# Patient Record
Sex: MALE | Race: WHITE
[De-identification: names, ages, dates, MRNs, and addresses within clinical notes are randomized per-mention and may not be internally consistent; named-entity substitution may affect disease eponyms.]

---

## 2018-05-05 ENCOUNTER — HOSPITAL ENCOUNTER (EMERGENCY)
Dept: HOSPITAL 97 - ER | Age: 33
Discharge: HOME | End: 2018-05-05
Payer: COMMERCIAL

## 2018-05-05 DIAGNOSIS — Z88.0: ICD-10-CM

## 2018-05-05 DIAGNOSIS — Z88.1: ICD-10-CM

## 2018-05-05 DIAGNOSIS — S62.315A: Primary | ICD-10-CM
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
## 2018-05-05 PROCEDURE — 2W3KX1Z IMMOBILIZATION OF LEFT FINGER USING SPLINT: ICD-10-PCS

## 2018-05-05 PROCEDURE — 99284 EMERGENCY DEPT VISIT MOD MDM: CPT

## 2018-05-05 NOTE — RAD REPORT
EXAM DESCRIPTION:  RAD -Hand Left 3 View - 5/5/2018 2:09 pm

 

CLINICAL HISTORY:

Left hand pain status post injury

 

FINDINGS:  A comminuted fracture involves the fourth metacarpal neck with mild displacement of the fr
acture fragments and angulation at the fracture site.

 

No dislocation is seen

## 2018-05-05 NOTE — ER
Nurse's Notes                                                                                     

 [facility]                                                                

Name: Rajesh Kenny                                                                       

Age: 32 yrs                                                                                       

Sex: Male                                                                                         

: 1985                                                                                   

MRN: [id number]                                                                                   

Arrival Date: 2018                                                                          

Time: 13:10                                                                                       

Account#: [id number]                                                                            

Bed 28                                                                                            

Private MD:                                                                                       

Diagnosis: Displaced fracture of base of fourth metacarpal bone, left hand                        

                                                                                                  

Presentation:                                                                                     

                                                                                             

13:16 Presenting complaint: Patient states: "I punched my truck today around 09:30 am". Pt    aa5 

      c/o left hand pain. Transition of care: patient was not received from another setting       

      of care. Onset of symptoms was May 05, 2018. Initial Sepsis Screen: Does the patient        

      meet any 2 criteria? No. Patient's initial sepsis screen is negative. Does the patient      

      have a suspected source of infection? No. Patient's initial sepsis screen is negative.      

      Care prior to arrival: None.                                                                

13:16 Method Of Arrival: Ambulatory                                                           aa5 

13:16 Acuity: SARAVANAN 4                                                                           aa5 

                                                                                                  

Historical:                                                                                       

- Allergies:                                                                                      

13:18 Amoxil;                                                                                 aa5 

13:18 PENICILLINS;                                                                            aa5 

- PMHx:                                                                                           

13:18 None;                                                                                   aa5 

- PSHx:                                                                                           

13:18 None;                                                                                   aa5 

                                                                                                  

- Immunization history:: Last tetanus immunization: up to date.                                   

- Social history:: Smoking status: Patient uses tobacco products, smokes one pack                 

  cigarettes per day.                                                                             

                                                                                                  

                                                                                                  

Screenin:45 Abuse screen: Denies threats or abuse. Nutritional screening: No deficits noted.        tl3 

      Tuberculosis screening: No symptoms or risk factors identified. Fall Risk None              

      identified.                                                                                 

                                                                                                  

Assessment:                                                                                       

13:30 General: Appears in no apparent distress. comfortable, well groomed, well developed,    tl3 

      well nourished, Behavior is calm, cooperative, appropriate for age. Pain: Complains of      

      pain in left hand and dorsum of left hand. Neuro: Level of Consciousness is awake,          

      alert, obeys commands, Oriented to person, place, time, situation, Appropriate for age.     

      Cardiovascular: No deficits noted. Cardiovascular: Capillary refill < 3 seconds in          

      right in left. Respiratory: No deficits noted. Airway is patent Trachea midline             

      Respiratory effort is even, unlabored, Respiratory pattern is regular, symmetrical. GI:     

      No signs and/or symptoms were reported involving the gastrointestinal system. : No        

      signs and/or symptoms were reported regarding the genitourinary system. EENT: No signs      

      and/or symptoms were reported regarding the EENT system. Derm: No signs and/or symptoms     

      reported regarding the dermatologic system. Musculoskeletal: Swelling present in left       

      hand and dorsum of left hand. Injury Description: pt punched truck.                         

14:45 Reassessment: Patient appears in no apparent distress at this time. No changes from     tl3 

      previously documented assessment. Patient and/or family updated on plan of care and         

      expected duration. Pain level reassessed. Patient is alert, oriented x 3, equal             

      unlabored respirations, skin warm/dry/pink.                                                 

                                                                                                  

Vital Signs:                                                                                      

13:17  / 99; Pulse 89; Resp 16 S; Temp 98.0(TE); Pulse Ox 98% on R/A; Weight 113.4 kg   aa5 

      (R); Height 6 ft. 1 in. (185.42 cm) (R); Pain 3/10;                                         

15:29  / 82; Pulse 78; Resp 18; Pulse Ox 100% ;                                         tl3 

13:17 Body Mass Index 32.98 (113.40 kg, 185.42 cm)                                            aa5 

                                                                                                  

ED Course:                                                                                        

13:10 Patient arrived in ED.                                                                  sb2 

13:14 Hilary Ramirez FNP-C is PHCP.                                                        kb  

13:14 Caleb Lewis MD is Attending Physician.                                                kb  

13:17 Triage completed.                                                                       aa5 

13:18 Arm band placed on.                                                                     aa5 

14:03 X-ray completed. Portable x-ray completed in exam room. Patient tolerated procedure     la2 

      well.                                                                                       

14:05 Hand Left 3 View XRAY In Process Unspecified.                                           EDMS

14:41 Kaylan Adames, RN is Primary Nurse.                                                     tl3 

14:45 Patient has correct armband on for positive identification. Bed in low position. Call   tl3 

      light in reach. Adult w/ patient.                                                           

14:45 No provider procedures requiring assistance completed. Patient did not have IV access   tl3 

      during this emergency room visit.                                                           

15:28 Orthoglass splint: Ulnar gutter/Boxer splint applied on right forearm.                  tl3 

                                                                                                  

Administered Medications:                                                                         

15:00 Drug: Norco (7.5 mg-325 mg) 1 tabs Route: PO;                                           tl3 

15:30 Follow up: Response: Medication administered at discharge.                              tl3 

                                                                                                  

                                                                                                  

Outcome:                                                                                          

14:31 Discharge ordered by MD.                                                                kb  

15:28 Discharged to home                                                                      tl3 

15:28 Condition: good                                                                             

15:28 Discharge instructions given to patient, Instructed on discharge instructions, follow       

      up and referral plans. medication usage, Demonstrated understanding of instructions,        

      follow-up care, medications, splint care, Prescriptions given X 1.                          

15:30 Patient left the ED.                                                                    tl3 

                                                                                                  

Signatures:                                                                                       

Dispatcher [employer]                           EDHilary Reardon, LORIP-C                 MAIK-Rosa Wesley, RN                     RN   aa5                                                  

Arielle Junior                               la2                                                  

Precious Elizalde                               sb2                                                  

Kaylan Adames, RN                       RN   tl3                                                  

                                                                                                  

Corrections: (The following items were deleted from the chart)                                    

13:18 13:17 Pulse 89bpm; Resp 16bpm; Spontaneous; Pulse Ox 98% RA; Temp 98.0F Temporal; 113.4 aa5 

      kg Reported; Height 6 ft. 1 in. Reported; BMI: 32.9; Pain 3/10; aa5                         

15:22 13:30 Reassessment: Patient appears in no apparent distress at this time. No changes    tl3 

      from previously documented assessment. Patient and/or family updated on plan of care        

      and expected duration. Pain level reassessed. Patient is alert, oriented x 3, equal         

      unlabored respirations, skin warm/dry/pink. tl3                                             

                                                                                                  

**************************************************************************************************

## 2018-05-05 NOTE — EDPHYS
Physician Documentation                                                                           

 [facility]                                                                

Name: Rajesh Kenny                                                                       

Age: 32 yrs                                                                                       

Sex: Male                                                                                         

: 1985                                                                                   

MRN: [id number]                                                                                   

Arrival Date: 2018                                                                          

Time: 13:10                                                                                       

Account#: [id number]                                                                            

Bed 28                                                                                            

Private MD:                                                                                       

ED Physician Caleb Lewis                                                                         

HPI:                                                                                              

                                                                                             

13:52 This 32 yrs old  Male presents to ER via Ambulatory with complaints of Crush   kb  

      Injury To Hand.                                                                             

13:52 The patient or guardian reports injury, pain, swelling, tenderness. The complaints      kb  

      affect the dorsum of left hand. Context: The problem was sustained at home, resulted        

      from using own fist to strike, a solid object. Onset: The symptoms/episode                  

      began/occurred this morning. Modifying factors: The symptoms are alleviated by nothing,     

      the symptoms are aggravated by movement. Associated signs and symptoms: The patient has     

      no apparent associated signs or symptoms. Severity of symptoms: At their worst the          

      symptoms were moderate, in the emergency department the symptoms are unchanged. The         

      patient has not experienced similar symptoms in the past. The patient has not recently      

      seen a physician.                                                                           

                                                                                                  

Historical:                                                                                       

- Allergies:                                                                                      

13:18 Amoxil;                                                                                 aa5 

13:18 PENICILLINS;                                                                            aa5 

- PMHx:                                                                                           

13:18 None;                                                                                   aa5 

- PSHx:                                                                                           

13:18 None;                                                                                   aa5 

                                                                                                  

- Immunization history:: Last tetanus immunization: up to date.                                   

- Social history:: Smoking status: Patient uses tobacco products, smokes one pack                 

  cigarettes per day.                                                                             

                                                                                                  

                                                                                                  

ROS:                                                                                              

13:50 Constitutional: Negative for fever, chills, and weight loss, Cardiovascular: Negative   kb  

      for chest pain, palpitations, and edema, Respiratory: Negative for shortness of breath,     

      cough, wheezing, and pleuritic chest pain, Abdomen/GI: Negative for abdominal pain,         

      nausea, vomiting, diarrhea, and constipation, Neuro: Negative for headache, weakness,       

      numbness, tingling, and seizure.                                                            

13:50 MS/extremity: Positive for injury or acute deformity, pain, swelling, tenderness, of        

      the dorsum of left hand.                                                                    

                                                                                                  

Exam:                                                                                             

13:51 Constitutional:  This is a well developed, well nourished patient who is awake, alert,  kb  

      and in no acute distress. Head/Face:  Normocephalic, atraumatic. Chest/axilla:  Normal      

      chest wall appearance and motion.  Nontender with no deformity.  No lesions are             

      appreciated. Cardiovascular:  Regular rate and rhythm with a normal S1 and S2.  No          

      gallops, murmurs, or rubs.  Normal PMI, no JVD.  No pulse deficits. Respiratory:  Lungs     

      have equal breath sounds bilaterally, clear to auscultation and percussion.  No rales,      

      rhonchi or wheezes noted.  No increased work of breathing, no retractions or nasal          

      flaring. Abdomen/GI:  Soft, non-tender, with normal bowel sounds.  No distension or         

      tympany.  No guarding or rebound.  No evidence of tenderness throughout. Skin:  Warm,       

      dry with normal turgor.  Normal color with no rashes, no lesions, and no evidence of        

      cellulitis. Neuro:  Awake and alert, GCS 15, oriented to person, place, time, and           

      situation.  Cranial nerves II-XII grossly intact.  Motor strength 5/5 in all                

      extremities.  Sensory grossly intact.  Cerebellar exam normal.  Normal gait.                

13:51 Musculoskeletal/extremity: Extremities: grossly normal except: noted in the dorsum of       

      left hand: pain, swelling, tenderness, ROM: intact in all extremities, Circulation is       

      intact in all extremities. Sensation intact.                                                

                                                                                                  

Vital Signs:                                                                                      

13:17  / 99; Pulse 89; Resp 16 S; Temp 98.0(TE); Pulse Ox 98% on R/A; Weight 113.4 kg   aa5 

      (R); Height 6 ft. 1 in. (185.42 cm) (R); Pain 3/10;                                         

15:29  / 82; Pulse 78; Resp 18; Pulse Ox 100% ;                                         tl3 

13:17 Body Mass Index 32.98 (113.40 kg, 185.42 cm)                                            aa5 

                                                                                                  

MDM:                                                                                              

13:18 Patient medically screened.                                                             kb  

13:51 Data reviewed: vital signs, nurses notes. Data interpreted: Pulse oximetry: on room air kb  

      is 98 %. Interpretation: normal.                                                            

14:29 Counseling: I had a detailed discussion with the patient and/or guardian regarding: the kb  

      historical points, exam findings, and any diagnostic results supporting the                 

      discharge/admit diagnosis, radiology results, the need for outpatient follow up, a          

      orthopedic surgeon, to return to the emergency department if symptoms worsen or persist     

      or if there are any questions or concerns that arise at home.                               

                                                                                                  

                                                                                             

13:18 Order name: Hand Left 3 View XRAY; Complete Time: 14:25                                 kb  

                                                                                             

14:30 Order name: Ulnar Gutter splint; Complete Time: 15:30                                   kb  

                                                                                                  

Administered Medications:                                                                         

15:00 Drug: Norco (7.5 mg-325 mg) 1 tabs Route: PO;                                           tl3 

15:30 Follow up: Response: Medication administered at discharge.                              tl3 

                                                                                                  

                                                                                                  

Disposition:                                                                                      

18:10 Co-signature as Attending Physician, Caleb Lewis MD.                                    rn  

                                                                                                  

Disposition:                                                                                      

18 14:31 Discharged to Home. Impression: Displaced fracture of base of fourth metacarpal    

  bone, left hand.                                                                                

- Condition is Stable.                                                                            

- Discharge Instructions: Boxer's Fracture.                                                       

- Prescriptions for Tylenol- Codeine #3 300-30 mg Oral Tablet - take 1 tablet by ORAL             

  route every 6 hours As needed; 15 tablet.                                                       

- Medication Reconciliation Form, Thank You Letter, Antibiotic Education, Prescription            

  Opioid Use, Work release form form.                                                             

- Follow up: Emergency Department; When: As needed; Reason: Worsening of condition.               

  Follow up: Private Physician; When: 2 - 3 days; Reason: Recheck today's complaints,             

  Continuance of care, Re-evaluation by your physician.                                           

                                                                                                  

                                                                                                  

                                                                                                  

Signatures:                                                                                       

Dispatcher [employer]                           EDMS                                                 

Hilary Ramirez, FNP-C                 FNP-Ckb                                                   

Caleb Lewis MD MD rn Calderon, Audri, RN                     RN   aa5                                                  

Kaylan Adames, JE                       RN   tl3                                                  

                                                                                                  

Corrections: (The following items were deleted from the chart)                                    

13:51 13:50 MS/extremity: Positive for injury or acute deformity, contusion, ecchymosis,      kb  

      pain, swelling, tenderness, of the dorsum of right hand, kb                                 

15:30 14:31 2018 14:31 Discharged to Home. Impression: Displaced fracture of base of    tl3 

      fourth metacarpal bone, left hand. Condition is Stable. Forms are Medication                

      Reconciliation Form, Thank You Letter, Antibiotic Education, Prescription Opioid Use.       

      Follow up: Emergency Department; When: As needed; Reason: Worsening of condition.           

      Follow up: Private Physician; When: 2 - 3 days; Reason: Recheck today's complaints,         

      Continuance of care, Re-evaluation by your physician. kb                                    

                                                                                                  

**************************************************************************************************

## 2019-12-01 ENCOUNTER — HOSPITAL ENCOUNTER (EMERGENCY)
Dept: HOSPITAL 97 - ER | Age: 34
Discharge: HOME | End: 2019-12-01
Payer: COMMERCIAL

## 2019-12-01 VITALS — SYSTOLIC BLOOD PRESSURE: 140 MMHG | OXYGEN SATURATION: 98 % | DIASTOLIC BLOOD PRESSURE: 97 MMHG

## 2019-12-01 DIAGNOSIS — Z88.0: ICD-10-CM

## 2019-12-01 DIAGNOSIS — Z88.1: ICD-10-CM
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
## 2019-12-01 DIAGNOSIS — T22.612A: Primary | ICD-10-CM
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
## 2019-12-01 DIAGNOSIS — F17.210: ICD-10-CM

## 2019-12-01 DIAGNOSIS — T59.891A: ICD-10-CM
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
## 2019-12-01 DIAGNOSIS — T32.0: ICD-10-CM

## 2019-12-01 DIAGNOSIS — K21.9: ICD-10-CM

## 2019-12-01 PROCEDURE — 99283 EMERGENCY DEPT VISIT LOW MDM: CPT
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
